# Patient Record
Sex: FEMALE | URBAN - METROPOLITAN AREA
[De-identification: names, ages, dates, MRNs, and addresses within clinical notes are randomized per-mention and may not be internally consistent; named-entity substitution may affect disease eponyms.]

---

## 2017-10-19 ENCOUNTER — APPOINTMENT (OUTPATIENT)
Dept: URBAN - METROPOLITAN AREA CLINIC 293 | Age: 67
Setting detail: DERMATOLOGY
End: 2017-10-19

## 2017-10-19 DIAGNOSIS — L82.0 INFLAMED SEBORRHEIC KERATOSIS: ICD-10-CM

## 2017-10-19 DIAGNOSIS — Z85.828 PERSONAL HISTORY OF OTHER MALIGNANT NEOPLASM OF SKIN: ICD-10-CM

## 2017-10-19 PROBLEM — D48.5 NEOPLASM OF UNCERTAIN BEHAVIOR OF SKIN: Status: ACTIVE | Noted: 2017-10-19

## 2017-10-19 PROCEDURE — 11302 SHAVE SKIN LESION 1.1-2.0 CM: CPT

## 2017-10-19 PROCEDURE — OTHER DEFER: OTHER

## 2017-10-19 PROCEDURE — 11301 SHAVE SKIN LESION 0.6-1.0 CM: CPT

## 2017-10-19 PROCEDURE — 99213 OFFICE O/P EST LOW 20 MIN: CPT | Mod: 25

## 2017-10-19 PROCEDURE — OTHER COUNSELING: OTHER

## 2017-10-19 PROCEDURE — OTHER SHAVE REMOVAL: OTHER

## 2017-10-19 ASSESSMENT — LOCATION DETAILED DESCRIPTION DERM
LOCATION DETAILED: RIGHT MEDIAL SUPERIOR CHEST
LOCATION DETAILED: RIGHT PROXIMAL PRETIBIAL REGION
LOCATION DETAILED: RIGHT DISTAL PRETIBIAL REGION
LOCATION DETAILED: LEFT DISTAL PRETIBIAL REGION

## 2017-10-19 ASSESSMENT — LOCATION ZONE DERM
LOCATION ZONE: LEG
LOCATION ZONE: TRUNK

## 2017-10-19 ASSESSMENT — LOCATION SIMPLE DESCRIPTION DERM
LOCATION SIMPLE: RIGHT PRETIBIAL REGION
LOCATION SIMPLE: CHEST
LOCATION SIMPLE: LEFT PRETIBIAL REGION

## 2019-12-23 NOTE — PROCEDURE: SHAVE REMOVAL
Wound RN Consult: I am asked to see this patient for lower leg wounds.  She was seen by podiatry 2 weeks ago and It was noted she has pressure injury to the right heel and blisters to the left ankle.  Hibicleanse and antibiotic cream were ordered. In March 2019, she was seen by PCP for cellulitis and noted to have stasis changes.  In 2018 she was seen be dermatology for a squamous cell carcinoma and has a distant history of basal cell carcinoma and melanoma on the right arm. Last visit with dermatology May 2019. States she has seen wound care about 2 years ago for pressure injury to buttocks.    Scabbed area on right buttock with healed scar tissue surrounding scabs.  Scattered partial thickness wounds present surrounded by epithelial tissue, measured as 1 wound 5cm x 1 cm, no depth.  She applies barrier cream at home and has several chair cushions she uses at home.  Will continue to use barrier cream as she is incontinent of urine.  Provided chair cushion for use in hospital and home use.  Advised to use this cushion if her other cushions provide less support.      Blister noted to right anterior lower leg due to fluid and edema.  Actively draining serous exudate.  Will cover with Mepilex light non-bordered dressing to allow for gentle removal and absorption of exudate.      Right heel wound is covered with thick callus.  She states podiatry cares for the heel. No drainage present, no periwound erythema.  Applied lotion.  She will be sitting in chair to eat lunch, instructed staff to elevate and float heel when in bed.     Bilateral lower leg edema and hemosiderin staining present.  Pedal and dorsalis pedis pulses present bilaterally via doppler.  She normally wears compression stockings for lower leg edema, tubular compression socks.  Admits they are quite old and probably not holding compression.  Measured legs, right leg calf circumference is 46 cm (size F Tetragrip) and left leg calf circumference is 43 cm 
(size E Tetragrip).  Provided 1 pair for home use and applied stockings to legs today.    Right buttock      Overview bilateral lower legs   Right heel           Admit Date: 12/22/2019  Admit Diagnosis(es):   Hypoxia [R09.02]  Severe asthma with exacerbation, unspecified whether persistent [J45.901]      History of Present Illness: chronic      Past Medical History:   Diagnosis Date   • Abnormal LFTs 3/15   • Anemia, blood loss 03/2017    EGD gastritis   • Anticoagulant long-term use 03/2017    on Eliquis EPS Hani stopped 9/20127 per rodrick haywood   • Arteriosclerotic heart disease (ASHD) 2011    silent MI on stress test   • Asthma, mild intermittent    • Atrophic kidney     right on US   • Basal cell carcinoma    • Bronchitis    • Cholelithiases    • Chronic kidney disease, stage III (moderate) (CMS/[facility])    • Chronic pain    • Complete rupture of rotator cuff    • COPD (chronic obstructive pulmonary disease) (CMS/[facility])    • DDD (degenerative disc disease), lumbar    • Dermatophytosis of nail     has declined rx so far   • DNR (do not resuscitate) discussion 02/2018    reiterates desire to be DNR   • Edema     chronic   • Edema 11/2007   • Esophagitis, [place] grade D 03/2017    Delfina Melena   • Fracture     right leg   • Gastroesophageal reflux disease    • Hematoma 3/14    knee [facility] transfusion required   • [place] (hard of hearing)     bilateral ha   • Hypersomnia with sleep apnea, unspecified 2008   • Ischemia 10/13    stress test   • Klebsiella pneumoniae sepsis (CMS/[facility]) 3/15   • Knee injury    • Lumbar spondylosis     with mechanical lumbalgia   • Macular degeneration    • Malignant melanoma of skin of upper limb, including shoulder (CMS/[facility]) 11/2004    0.12mm right posterior superior arm   • Morbid obesity with BMI of 40.0-44.9, adult (CMS/[facility])    • MR (mitral regurgitation) 1/15   • Myalgia and myositis, unspecified    • OA (osteoarthritis) of shoulder     left [place]   • Obstructive sleep apnea on CPAP 
3/15   • Old myocardial infarction    • Osteoarthrosis, unspecified whether generalized or localized, lower leg     knees   • Osteoarthrosis, unspecified whether generalized or localized, unspecified site     Degenerative joint disease   • Other and unspecified hyperlipidemia 3/2001    Hyperlipidemia, mild   • Other malignant neoplasm of skin, site unspecified 2004    Skin Cancer BCC chest and back [place]   • Personal history of colonic polyps 1/28/11    Dr. Salcedo no path.  F/u 5 years.   • Personal history of traumatic fracture 1975    Rt leg   • Pneumonia 1/15    hypoxemia   • Primary pulmonary hypertension (CMS/HCC)    • Prolapse of vaginal walls without mention of uterine prolapse     rectocele Abdiaziz   • Pulmonary hyperinflation 1/15    56   • Shingles    • Skin Cancer 7-2006,9/2006    bcc right superior lateral lip, left nose 9/19/06   • Sleep apnea    • Special screening for malignant neoplasms, colon 11/11/2005    hyperplastic and adenomatous polyps.   • Spinal stenosis of lumbar region 2013   • Spinal stenosis, lumbar region, without neurogenic claudication    • Squamous cell carcinoma    • Type II or unspecified type diabetes mellitus without mention of complication, not stated as uncontrolled 3/1999    Diabetes Mellitus diet controlled   • Unspecified asthma(493.90)     mild, intermittent   • Unspecified essential hypertension    • Unspecified hypothyroidism     Hypothyroidism, euthyroid on replacement tx   • Unspecified venous (peripheral) insufficiency 11/2007   • Urinary incontinence      Past Surgical History:   Procedure Laterality Date   • Ablation - atrial flutter  03/21/2017    Right CTI   • Appendectomy  age 18   • Cardiac stress test complete  2/06   • Cardiac surgery     • Chemosurg mohs 1st stage  9/19/06    lt nose   • Colonoscopy diagnostic  1/28/11    Dr. Salcedo no path.  f/u 5 years.   • Colonoscopy remove lesion by snare  11/11/05    Dr. Salcedo hyperplastic and adenomatous polyps.  repeat 
Path Notes (To The Dermatopathologist): Please check margins.
colon 5 yrs.   • Coronary stent placement  11/12    RCA and circ/ 2 stents Dr. Mathis   • Coronary stent placement  10/29/2013    ; 3.5 x 18 overlapped with 3.0 x 26 mm stent to mid LAD   • Debride mastoid cavity,complex  age 4    right   • Dexa bone density axial skeleton  2003    normal   • Ercp  11/17/15    Dr. Amaya,Stent Removal, Papillotomy, Balloon Sweep   • Ercp,sphincterotomy  9/23/15    Dr. Amaya, w/stent placed, F/U 8 weeks   • Esophagogastroduodenoscopy  09/20/2017    Healed ulcer - Dr Mathis   • Esophagogastroduodenoscopy transoral flex w/bx single or mult  03/28/2017    Grade D Esophagitis - next exam due in 3-4 months - Dr Mathis   • Excision of lingual tonsil     • Eye surgery     • Flexible sigmoidoscopy dx  2001    [facility]   • Hernia repair     • Inj epid/subchd anest,lumb/sac  12/2/09    #1.Caudal KVNG repeat MAC   • Inj epid/subchd anest,lumb/sac  12/16/09    #2.caudal KVNG repeat MAC   • Inj transforam epidural lumbar/sacral  12/30/09    #.3.B/L L4-5 Tf-KVNG OV   • Joint replacement     • Knee scope,diagnostic  6/15/07    left knee scope/Dr Smith at [facility]   • Laparoscopy,cholyst w/ cholgpy  9/23/2015    Dr. Sudhir Beauchamp   • Past surgical history  4/16     Eye Gepp lid surgery to improve draiange   • Ptca     • Removal gallbladder  2015   • Remv 2nd cataract,corn-scler sectn  9/05    Cataract removal   • Remv 2nd cataract,corn-scler sectn  8/05    Cataract removal   • Service to gastroenterology     • Service to pulmonary testing  10/03    mild obst defect   • Skin biopsy     • Tonsillectomy and adenoidectomy     • Total knee replacement  9/19/07    Left knee replacement/Dr Smith       Labs:     Albumin (g/dL)   Date Value   12/22/2019 3.4 (L)     Glucose (mg/dL)   Date Value   12/22/2019 223 (H)     GLYCOHEMOGLOBIN A1C (%)   Date Value   05/17/1999 6.9 (H)     Hemoglobin A1C (%)   Date Value   12/18/2019 6.2 (H)       Assessment    Baseline activity: lives at home with 2 
Notification Instructions: Patient will be notified of biopsy results. However, patient instructed to call the office if not contacted within 2 weeks.
sons  Orientation: A&O x 3  Independent at home: Yes  Admit Real Score: 17  Current Real Score: 17  Is patient at high risk for pressure injury development? No - at moderate risk  BMI: Body mass index is 45.57 kg/m².     Labs:    Albumin (g/dL)   Date Value   12/22/2019 3.4 (L)     Glucose (mg/dL)   Date Value   12/22/2019 223 (H)     GLYCOHEMOGLOBIN A1C (%)   Date Value   05/17/1999 6.9 (H)     Hemoglobin A1C (%)   Date Value   12/18/2019 6.2 (H)         Diagnostic assessments reviewed: no KUN studies to review    Wounds  Blister to right lower leg  Partial thickness wound to right buttock    Patient Education: discussed use of barrier cream and chair cushion for buttock wound, reviewed compression with tubular stockings.  She will order stockings when she is at home as she understands her home stockings are no longer providing adequate compression.      Wound Care RN Recommendations:  1. [place] barrier cream to buttocks,three times a day and as needed after incontinence episodes. Do not rub all of cream off each time, just wipe gently and apply new thick layer of cream. This will protect against incontinence, but also friction/shearing.   2. Aggressive offloading to ensure wound does not worsen. Reposition every 2 hours and as needed at a minimum. Use patron to assist with repositioning and transfers.  3. Use chair cushion at all times while patient is up in chair.  4. Place pillows under ankles or bilateral prevalon boots to off-load heels at all times while in bed.  5. Generalized moisturizer to dry skin daily and as needed  6. Encourage protein intake to promote wound healing per dietician recommendations.    7. Thin foam non-border dressing to right lower leg blister, change 3 times per week and as needed.  8. Bilateral lower leg compression, moderate Tetragrip stockings      Recommended Devices: Chair cusion - foam/gel and Heel off loading with pillow(s)       Plan  Follow-up in 1 week if still 
X Size Of Lesion In Cm (Optional): 0
hospitalized  Has wound clinic appointment on January 6, 2010    Consulted with RN caring for patient    Thank you for allowing me to participate in the patient's care.    Time Spent for Consult: 35 minutes with patient               
Bill For Surgical Tray: no
Consent was obtained from the patient. The risks and benefits to therapy were discussed in detail. Specifically, the risks of infection, scarring, bleeding, prolonged wound healing, incomplete removal, allergy to anesthesia, nerve injury and recurrence were addressed. Prior to the procedure, the treatment site was clearly identified and confirmed by the patient. All components of Universal Protocol/PAUSE Rule completed.
Size Of Lesion In Cm (Required): 0.9
Anesthesia Type: 1% lidocaine with epinephrine
Detail Level: Detailed
Medical Necessity Information: It is in your best interest to select a reason for this procedure from the list below. All of these items fulfill various CMS LCD requirements except the new and changing color options.
Hemostasis: Drysol
Billing Type: Third-Party Bill
Biopsy Method: Dermablade
Post-Care Instructions: I reviewed with the patient in detail post-care instructions. Patient is to keep the biopsy site dry overnight, and then apply bacitracin twice daily until healed. Patient may apply hydrogen peroxide soaks to remove any crusting.
Medical Necessity Clause: This procedure was medically necessary because the lesion that was treated was:
Size Of Lesion In Cm (Required): 1.1
Wound Care: Bacitracin

## 2021-03-18 ENCOUNTER — APPOINTMENT (OUTPATIENT)
Dept: URBAN - METROPOLITAN AREA CLINIC 293 | Age: 71
Setting detail: DERMATOLOGY
End: 2021-03-19

## 2021-03-18 DIAGNOSIS — L20.81 ATOPIC NEURODERMATITIS: ICD-10-CM

## 2021-03-18 DIAGNOSIS — B07.8 OTHER VIRAL WARTS: ICD-10-CM

## 2021-03-18 DIAGNOSIS — L82.0 INFLAMED SEBORRHEIC KERATOSIS: ICD-10-CM

## 2021-03-18 DIAGNOSIS — L82.1 OTHER SEBORRHEIC KERATOSIS: ICD-10-CM

## 2021-03-18 DIAGNOSIS — Z85.828 PERSONAL HISTORY OF OTHER MALIGNANT NEOPLASM OF SKIN: ICD-10-CM

## 2021-03-18 PROBLEM — D48.5 NEOPLASM OF UNCERTAIN BEHAVIOR OF SKIN: Status: ACTIVE | Noted: 2021-03-18

## 2021-03-18 PROCEDURE — 17110 DESTRUCT B9 LESION 1-14: CPT

## 2021-03-18 PROCEDURE — OTHER COUNSELING: OTHER

## 2021-03-18 PROCEDURE — OTHER PRESCRIPTION: OTHER

## 2021-03-18 PROCEDURE — OTHER LIQUID NITROGEN: OTHER

## 2021-03-18 PROCEDURE — 11102 TANGNTL BX SKIN SINGLE LES: CPT | Mod: 59

## 2021-03-18 PROCEDURE — 99203 OFFICE O/P NEW LOW 30 MIN: CPT | Mod: 25

## 2021-03-18 PROCEDURE — OTHER BIOPSY BY SHAVE METHOD: OTHER

## 2021-03-18 RX ORDER — MUPIROCIN 20 MG/G
OINTMENT TOPICAL
Qty: 1 | Refills: 3 | Status: ERX | COMMUNITY
Start: 2021-03-18

## 2021-03-18 RX ORDER — TRIAMCINOLONE ACETONIDE 1 MG/G
CREAM TOPICAL BID
Qty: 1 | Refills: 1 | Status: ERX | COMMUNITY
Start: 2021-03-18

## 2021-03-18 ASSESSMENT — LOCATION SIMPLE DESCRIPTION DERM
LOCATION SIMPLE: LEFT CLAVICULAR SKIN
LOCATION SIMPLE: LEFT UPPER BACK
LOCATION SIMPLE: LEFT FOREHEAD
LOCATION SIMPLE: CHEST
LOCATION SIMPLE: ABDOMEN

## 2021-03-18 ASSESSMENT — LOCATION ZONE DERM
LOCATION ZONE: FACE
LOCATION ZONE: TRUNK

## 2021-03-18 ASSESSMENT — LOCATION DETAILED DESCRIPTION DERM
LOCATION DETAILED: LEFT CLAVICULAR SKIN
LOCATION DETAILED: LEFT SUPERIOR UPPER BACK
LOCATION DETAILED: SUBXIPHOID
LOCATION DETAILED: RIGHT MEDIAL SUPERIOR CHEST
LOCATION DETAILED: LEFT INFERIOR LATERAL FOREHEAD

## 2021-03-18 NOTE — PROCEDURE: BIOPSY BY SHAVE METHOD
Biopsy Type: H and E
Silver Nitrate Text: The wound bed was treated with silver nitrate after the biopsy was performed.
Hide Biopsy Depth?: No
Wound Care: Bacitracin
Cryotherapy Text: The wound bed was treated with cryotherapy after the biopsy was performed.
Anesthesia Type: 2% lidocaine without epinephrine
Anesthesia Volume In Cc (Will Not Render If 0): 0.5
Notification Instructions: Patient will be notified of biopsy results. However, patient instructed to call the office if not contacted within 2 weeks.
Additional Anesthesia Volume In Cc (Will Not Render If 0): 0
Hemostasis: Aluminum Chloride and Electrocautery
Detail Level: Detailed
Electrodesiccation Text: The wound bed was treated with electrodesiccation after the biopsy was performed.
Was A Bandage Applied: Yes
Electrodesiccation And Curettage Text: The wound bed was treated with electrodesiccation and curettage after the biopsy was performed.
Consent: Written consent was obtained and risks were reviewed including but not limited to scarring, infection, bleeding, scabbing, incomplete removal, nerve damage and allergy to anesthesia.
Curettage Text: The wound bed was treated with curettage after the biopsy was performed.
Type Of Destruction Used: Curettage
Dressing: bandage
Biopsy Method: 15 blade
Post-Care Instructions: I reviewed with the patient in detail post-care instructions. Patient is to keep the biopsy site dry overnight, and then apply bacitracin twice daily until healed. Patient may apply hydrogen peroxide soaks to remove any crusting.
Depth Of Biopsy: dermis
Information: Selecting Yes will display possible errors in your note based on the variables you have selected. This validation is only offered as a suggestion for you. PLEASE NOTE THAT THE VALIDATION TEXT WILL BE REMOVED WHEN YOU FINALIZE YOUR NOTE. IF YOU WANT TO FAX A PRELIMINARY NOTE YOU WILL NEED TO TOGGLE THIS TO 'NO' IF YOU DO NOT WANT IT IN YOUR FAXED NOTE.
Billing Type: Third-Party Bill

## 2024-04-24 ENCOUNTER — APPOINTMENT (OUTPATIENT)
Dept: URBAN - METROPOLITAN AREA SURGERY 22 | Age: 74
Setting detail: DERMATOLOGY
End: 2024-04-24

## 2024-04-24 DIAGNOSIS — L29.8 OTHER PRURITUS: ICD-10-CM

## 2024-04-24 DIAGNOSIS — L28.1 PRURIGO NODULARIS: ICD-10-CM

## 2024-04-24 PROCEDURE — OTHER COUNSELING: OTHER

## 2024-04-24 PROCEDURE — OTHER PRESCRIPTION: OTHER

## 2024-04-24 PROCEDURE — 99203 OFFICE O/P NEW LOW 30 MIN: CPT

## 2024-04-24 RX ORDER — HYDROXYZINE HYDROCHLORIDE 25 MG/1
TABLET, FILM COATED ORAL
Qty: 30 | Refills: 2 | Status: ERX | COMMUNITY
Start: 2024-04-24

## 2024-04-24 RX ORDER — FEXOFENADINE HYDROCHLORIDE 180 MG/1
TABLET ORAL
Qty: 30 | Refills: 6 | Status: ERX | COMMUNITY
Start: 2024-04-24

## 2024-04-24 RX ORDER — TRIAMCINOLONE ACETONIDE 1 MG/G
CREAM TOPICAL BID
Qty: 454 | Refills: 0 | Status: ERX | COMMUNITY
Start: 2024-04-24

## 2024-04-24 ASSESSMENT — LOCATION ZONE DERM: LOCATION ZONE: TRUNK

## 2024-04-24 ASSESSMENT — LOCATION SIMPLE DESCRIPTION DERM
LOCATION SIMPLE: RIGHT UPPER BACK
LOCATION SIMPLE: UPPER BACK
LOCATION SIMPLE: CHEST

## 2024-04-24 ASSESSMENT — LOCATION DETAILED DESCRIPTION DERM
LOCATION DETAILED: LEFT LATERAL SUPERIOR CHEST
LOCATION DETAILED: SUPERIOR THORACIC SPINE
LOCATION DETAILED: RIGHT SUPERIOR MEDIAL UPPER BACK

## 2024-12-12 ENCOUNTER — APPOINTMENT (OUTPATIENT)
Dept: URBAN - METROPOLITAN AREA SURGERY 22 | Age: 74
Setting detail: DERMATOLOGY
End: 2024-12-12

## 2024-12-12 DIAGNOSIS — D49.2 NEOPLASM OF UNSPECIFIED BEHAVIOR OF BONE, SOFT TISSUE, AND SKIN: ICD-10-CM

## 2024-12-12 DIAGNOSIS — L85.3 XEROSIS CUTIS: ICD-10-CM

## 2024-12-12 PROCEDURE — 99212 OFFICE O/P EST SF 10 MIN: CPT | Mod: 25

## 2024-12-12 PROCEDURE — OTHER BIOPSY BY SHAVE METHOD: OTHER

## 2024-12-12 PROCEDURE — OTHER COUNSELING: OTHER

## 2024-12-12 PROCEDURE — 11103 TANGNTL BX SKIN EA SEP/ADDL: CPT

## 2024-12-12 PROCEDURE — OTHER PRESCRIPTION: OTHER

## 2024-12-12 PROCEDURE — 11102 TANGNTL BX SKIN SINGLE LES: CPT

## 2024-12-12 RX ORDER — AMMONIUM LACTATE 12 G/100G
LOTION TOPICAL
Qty: 225 | Refills: 3 | Status: ERX | COMMUNITY
Start: 2024-12-12

## 2024-12-12 ASSESSMENT — LOCATION DETAILED DESCRIPTION DERM
LOCATION DETAILED: MIDDLE STERNUM
LOCATION DETAILED: RIGHT POSTERIOR SHOULDER
LOCATION DETAILED: RIGHT MEDIAL UPPER BACK
LOCATION DETAILED: MID TRAPEZIAL NECK

## 2024-12-12 ASSESSMENT — LOCATION ZONE DERM
LOCATION ZONE: ARM
LOCATION ZONE: NECK
LOCATION ZONE: TRUNK

## 2024-12-12 ASSESSMENT — LOCATION SIMPLE DESCRIPTION DERM
LOCATION SIMPLE: RIGHT UPPER BACK
LOCATION SIMPLE: TRAPEZIAL NECK
LOCATION SIMPLE: CHEST
LOCATION SIMPLE: RIGHT SHOULDER

## 2024-12-12 NOTE — PROCEDURE: BIOPSY BY SHAVE METHOD
Detail Level: Detailed
Depth Of Biopsy: dermis
Was A Bandage Applied: Yes
Size Of Lesion In Cm: 0
Biopsy Type: H and E
Biopsy Method: Dermablade
Anesthesia Type: 1% lidocaine with epinephrine
Anesthesia Volume In Cc: 0.5
Hemostasis: Drysol
Wound Care: Petrolatum
Dressing: bandage
Destruction After The Procedure: No
Type Of Destruction Used: Curettage
Curettage Text: The wound bed was treated with curettage after the biopsy was performed.
Cryotherapy Text: The wound bed was treated with cryotherapy after the biopsy was performed.
Electrodesiccation Text: The wound bed was treated with electrodesiccation after the biopsy was performed.
Electrodesiccation And Curettage Text: The wound bed was treated with electrodesiccation and curettage after the biopsy was performed.
Silver Nitrate Text: The wound bed was treated with silver nitrate after the biopsy was performed.
Lab: [id number]
Medical Necessity Information: It is in your best interest to select a reason for this procedure from the list below. All of these items fulfill various CMS LCD requirements except the new and changing color options.
Consent: Written consent was obtained and risks were reviewed including but not limited to scarring, infection, bleeding, scabbing, incomplete removal, nerve damage and allergy to anesthesia.
Post-Care Instructions: I reviewed with the patient in detail post-care instructions. Patient is to keep the biopsy site dry overnight, and then apply bacitracin twice daily until healed. Patient may apply hydrogen peroxide soaks to remove any crusting.
Notification Instructions: Patient will be notified of biopsy results. However, patient instructed to call the office if not contacted within 2 weeks.
Billing Type: Third-Party Bill
Information: Selecting Yes will display possible errors in your note based on the variables you have selected. This validation is only offered as a suggestion for you. PLEASE NOTE THAT THE VALIDATION TEXT WILL BE REMOVED WHEN YOU FINALIZE YOUR NOTE. IF YOU WANT TO FAX A PRELIMINARY NOTE YOU WILL NEED TO TOGGLE THIS TO 'NO' IF YOU DO NOT WANT IT IN YOUR FAXED NOTE.